# Patient Record
Sex: MALE | Race: ASIAN | Employment: OTHER | URBAN - METROPOLITAN AREA
[De-identification: names, ages, dates, MRNs, and addresses within clinical notes are randomized per-mention and may not be internally consistent; named-entity substitution may affect disease eponyms.]

---

## 2023-05-24 ENCOUNTER — ORDER TRANSCRIPTION (OUTPATIENT)
Dept: PHYSICAL THERAPY | Facility: HOSPITAL | Age: 68
End: 2023-05-24

## 2023-05-24 DIAGNOSIS — M25.50 ARTHRALGIA: Primary | ICD-10-CM

## 2024-05-23 ENCOUNTER — APPOINTMENT (OUTPATIENT)
Dept: GENERAL RADIOLOGY | Facility: HOSPITAL | Age: 69
End: 2024-05-23
Attending: EMERGENCY MEDICINE

## 2024-05-23 ENCOUNTER — HOSPITAL ENCOUNTER (EMERGENCY)
Facility: HOSPITAL | Age: 69
Discharge: HOME | End: 2024-05-23
Attending: EMERGENCY MEDICINE

## 2024-05-23 VITALS
HEART RATE: 78 BPM | SYSTOLIC BLOOD PRESSURE: 135 MMHG | BODY MASS INDEX: 25.76 KG/M2 | RESPIRATION RATE: 18 BRPM | TEMPERATURE: 97 F | OXYGEN SATURATION: 97 % | DIASTOLIC BLOOD PRESSURE: 69 MMHG | WEIGHT: 140 LBS | HEIGHT: 62 IN

## 2024-05-23 DIAGNOSIS — M79.645 THUMB PAIN, LEFT: Primary | ICD-10-CM

## 2024-05-23 DIAGNOSIS — L03.114 CELLULITIS OF LEFT UPPER EXTREMITY: ICD-10-CM

## 2024-05-23 LAB
ANION GAP SERPL CALC-SCNC: 5 MMOL/L (ref 0–18)
BASOPHILS # BLD AUTO: 0.04 X10(3) UL (ref 0–0.2)
BASOPHILS NFR BLD AUTO: 0.4 %
BUN BLD-MCNC: 18 MG/DL (ref 9–23)
CALCIUM BLD-MCNC: 9.4 MG/DL (ref 8.5–10.1)
CHLORIDE SERPL-SCNC: 113 MMOL/L (ref 98–112)
CO2 SERPL-SCNC: 21 MMOL/L (ref 21–32)
CREAT BLD-MCNC: 1.19 MG/DL
EGFRCR SERPLBLD CKD-EPI 2021: 66 ML/MIN/1.73M2 (ref 60–?)
EOSINOPHIL # BLD AUTO: 0.23 X10(3) UL (ref 0–0.7)
EOSINOPHIL NFR BLD AUTO: 2.1 %
ERYTHROCYTE [DISTWIDTH] IN BLOOD BY AUTOMATED COUNT: 13.7 %
GLUCOSE BLD-MCNC: 95 MG/DL (ref 70–99)
HCT VFR BLD AUTO: 41.4 %
HGB BLD-MCNC: 13.3 G/DL
IMM GRANULOCYTES # BLD AUTO: 0.09 X10(3) UL (ref 0–1)
IMM GRANULOCYTES NFR BLD: 0.8 %
LYMPHOCYTES # BLD AUTO: 2.24 X10(3) UL (ref 1–4)
LYMPHOCYTES NFR BLD AUTO: 20.9 %
MCH RBC QN AUTO: 29.4 PG (ref 26–34)
MCHC RBC AUTO-ENTMCNC: 32.1 G/DL (ref 31–37)
MCV RBC AUTO: 91.4 FL
MONOCYTES # BLD AUTO: 1.16 X10(3) UL (ref 0.1–1)
MONOCYTES NFR BLD AUTO: 10.8 %
NEUTROPHILS # BLD AUTO: 6.95 X10 (3) UL (ref 1.5–7.7)
NEUTROPHILS # BLD AUTO: 6.95 X10(3) UL (ref 1.5–7.7)
NEUTROPHILS NFR BLD AUTO: 65 %
OSMOLALITY SERPL CALC.SUM OF ELEC: 290 MOSM/KG (ref 275–295)
PLATELET # BLD AUTO: 261 10(3)UL (ref 150–450)
POTASSIUM SERPL-SCNC: 4.4 MMOL/L (ref 3.5–5.1)
RBC # BLD AUTO: 4.53 X10(6)UL
SODIUM SERPL-SCNC: 139 MMOL/L (ref 136–145)
WBC # BLD AUTO: 10.7 X10(3) UL (ref 4–11)

## 2024-05-23 PROCEDURE — 96374 THER/PROPH/DIAG INJ IV PUSH: CPT

## 2024-05-23 PROCEDURE — 80048 BASIC METABOLIC PNL TOTAL CA: CPT | Performed by: EMERGENCY MEDICINE

## 2024-05-23 PROCEDURE — 73140 X-RAY EXAM OF FINGER(S): CPT | Performed by: EMERGENCY MEDICINE

## 2024-05-23 PROCEDURE — 85025 COMPLETE CBC W/AUTO DIFF WBC: CPT | Performed by: EMERGENCY MEDICINE

## 2024-05-23 PROCEDURE — 99284 EMERGENCY DEPT VISIT MOD MDM: CPT

## 2024-05-23 RX ORDER — LISINOPRIL AND HYDROCHLOROTHIAZIDE 12.5; 1 MG/1; MG/1
1 TABLET ORAL DAILY
COMMUNITY

## 2024-05-23 RX ORDER — ATORVASTATIN CALCIUM 10 MG/1
10 TABLET, FILM COATED ORAL NIGHTLY
COMMUNITY

## 2024-05-23 RX ORDER — IBUPROFEN 400 MG/1
400 TABLET ORAL EVERY 6 HOURS PRN
COMMUNITY

## 2024-05-23 RX ORDER — CLINDAMYCIN HYDROCHLORIDE 300 MG/1
300 CAPSULE ORAL 3 TIMES DAILY
Qty: 30 CAPSULE | Refills: 0 | Status: SHIPPED | OUTPATIENT
Start: 2024-05-23 | End: 2024-06-02

## 2024-05-23 NOTE — ED PROVIDER NOTES
Patient Seen in: [facility] Emergency Department      History     Chief Complaint   Patient presents with    Cellulitis     Stated Complaint: swelling to left thumb, painful and itching.  negative xray.  no fever    Subjective:   HPI    69-year-old male presents to ED with complaint of right thumb pain for the last 2 days.  He denies any trauma/injury.  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  He points to the distal interphalangeal joint of the right thumb as the area of pain.  He denies pain on the pulp of the thumb nor on the proximal part of the thumb.  He states the thumb is swollen and warm.    Objective:   Past Medical History:    Diabetes (HCC)    Essential hypertension              History reviewed. No pertinent surgical history.             Social History     Socioeconomic History    Marital status:    Tobacco Use    Smoking status: Every Day     Types: Cigarettes    Smokeless tobacco: Current   Vaping Use    Vaping status: Every Day   Substance and Sexual Activity    Alcohol use: Never    Drug use: Never     Social Determinants of Health      Received from [facility]              Review of Systems    Positive for stated complaint: swelling to left thumb, painful and itching.  negative xray.  no fever  Other systems are as noted in HPI.  Constitutional and vital signs reviewed.      All other systems reviewed and negative except as noted above.    Physical Exam     ED Triage Vitals [05/23/24 1127]   /74   Pulse 82   Resp 16   Temp 97.3 °F (36.3 °C)   Temp src Temporal   SpO2 96 %   O2 Device None (Room air)       Current Vitals:   No data recorded          Physical Exam    Musculoskeletal: Unable to bend right thumb DIP, warmth to touch, moderate edema.  Full range of motion of CMC joint.  Tender at DIP.    ED Course     Labs Reviewed   BASIC METABOLIC PANEL (8) - Abnormal; Notable for the following components:       Result Value    Chloride 113 (*)     All other  components within normal limits   CBC W/ DIFFERENTIAL - Abnormal; Notable for the following components:    Monocyte Absolute 1.16 (*)     All other components within normal limits   CBC WITH DIFFERENTIAL WITH PLATELET    Narrative:     The following orders were created for panel order CBC With Differential With Platelet.  Procedure                               Abnormality         Status                     ---------                               -----------         ------                     CBC W/ DIFFERENTIAL[[id number]]          Abnormal            Final result                 Please view results for these tests on the individual orders.             XR FINGER(S) (MIN 2 VIEWS), LEFT THUMB (CPT=73140)    Result Date: 5/23/2024  PROCEDURE:  XR FINGER(S) (MIN 2 VIEWS), LEFT THUMB (CPT=73140)  INDICATIONS:  swelling to left thumb, painful and itching.  negative xray.  no fever  COMPARISON:  None.  TECHNIQUE:  Three views of the finger were obtained.  PATIENT STATED HISTORY: (As transcribed by Technologist)     FINDINGS:              CONCLUSION:   2.6 mm foreign body in the soft tissues between the 1st and 2nd digit are noted.  Additional tiny linear radiopaque foreign bodies are noted in this region.  A near the skin subcutaneous tissues adjacent to the proximal phalanx of the thumb there is also  an additional tiny foreign body that measures 1 mm.  No fracture or dislocation.    LOCATION:  [id number]   Dictated by (CST): Tj Hernández MD on 5/23/2024 at 2:42 PM     Finalized by (CST): Tj Hernández MD on 5/23/2024 at 2:44 PM               [facility]      Medical Decision Making:    Differential diagnosis before testing includes arthritis, cellulitis, septic joint    Comorbidities that add complexity to management: Diabetes    I reviewed prior external ED notes including arthralgia of legs 5/24/2023    Discussions of management with: Discussed with Ortho, Dr. Jung, he agrees with plan for IV Ancef and discharged  home with antibiotics and states he will see him in the office tomorrow.  He also recommends giving splint to the thumb for support.      I personally reviewed the radiographs and my independent interpretation includes no fracture.  Reviewed x-ray, foreign metallic bodies are closer to proximal joint where he has no pain.    Shared decision making:    Discussed with Ortho, possible cellulitis.  Discussed possibility of septic joint, Ortho feels less likely.  Patient afebrile, no leukocytosis.  He actually has an elevated his monocytes, suggesting viral etiology.  IV Ancef and discharged home with antibiotics and states he will see him in the office tomorrow.  Thumb spica splint to the thumb for support.  Discussed with patient and family to return for worsening.                             Medical Decision Making      Disposition and Plan     Clinical Impression:  1. Thumb pain, left    2. Cellulitis of left upper extremity         Disposition:  Discharge  5/23/2024  3:10 pm    Follow-up:  Hola Jung MD  [address]  [address]  [facility] [address]  [phone]    Follow up in 1 day(s)            Medications Prescribed:  Discharge Medication List as of 5/23/2024  3:24 PM        START taking these medications    Details   clindamycin 300 MG Oral Cap Take 1 capsule (300 mg total) by mouth 3 (three) times daily for 10 days., Normal, Disp-30 capsule, R-0

## 2024-05-23 NOTE — ED INITIAL ASSESSMENT (HPI)
To the ED with c.o R thumb pain x 2 days. Seen at  and told he had metal fragments in thumb but no abx given. Denies any recent injury or open skin. + swelling to R thumb noted. + CMS to R thumb with cap refill less than 3 seconds, skin to area is pink warm and dry- no obvious open skin